# Patient Record
Sex: FEMALE | Race: OTHER | HISPANIC OR LATINO | URBAN - METROPOLITAN AREA
[De-identification: names, ages, dates, MRNs, and addresses within clinical notes are randomized per-mention and may not be internally consistent; named-entity substitution may affect disease eponyms.]

---

## 2019-09-03 ENCOUNTER — EMERGENCY (EMERGENCY)
Facility: HOSPITAL | Age: 69
LOS: 1 days | Discharge: HOME | End: 2019-09-03
Admitting: EMERGENCY MEDICINE
Payer: MEDICARE

## 2019-09-03 VITALS
WEIGHT: 179.9 LBS | TEMPERATURE: 98 F | RESPIRATION RATE: 18 BRPM | SYSTOLIC BLOOD PRESSURE: 127 MMHG | HEART RATE: 73 BPM | OXYGEN SATURATION: 98 % | DIASTOLIC BLOOD PRESSURE: 85 MMHG | HEIGHT: 66 IN

## 2019-09-03 DIAGNOSIS — M79.652 PAIN IN LEFT THIGH: ICD-10-CM

## 2019-09-03 DIAGNOSIS — M54.5 LOW BACK PAIN: ICD-10-CM

## 2019-09-03 PROCEDURE — 99283 EMERGENCY DEPT VISIT LOW MDM: CPT

## 2019-09-03 PROCEDURE — 99283 EMERGENCY DEPT VISIT LOW MDM: CPT | Mod: 25

## 2019-09-03 PROCEDURE — 96372 THER/PROPH/DIAG INJ SC/IM: CPT

## 2019-09-03 RX ORDER — KETOROLAC TROMETHAMINE 30 MG/ML
30 SYRINGE (ML) INJECTION ONCE
Refills: 0 | Status: DISCONTINUED | OUTPATIENT
Start: 2019-09-03 | End: 2019-09-03

## 2019-09-03 RX ORDER — MELOXICAM 15 MG/1
1 TABLET ORAL
Qty: 14 | Refills: 0
Start: 2019-09-03 | End: 2019-09-16

## 2019-09-03 RX ORDER — METHOCARBAMOL 500 MG/1
750 TABLET, FILM COATED ORAL ONCE
Refills: 0 | Status: COMPLETED | OUTPATIENT
Start: 2019-09-03 | End: 2019-09-03

## 2019-09-03 RX ORDER — METHOCARBAMOL 500 MG/1
1 TABLET, FILM COATED ORAL
Qty: 42 | Refills: 0
Start: 2019-09-03 | End: 2019-09-16

## 2019-09-03 RX ADMIN — METHOCARBAMOL 750 MILLIGRAM(S): 500 TABLET, FILM COATED ORAL at 21:40

## 2019-09-03 RX ADMIN — Medication 30 MILLIGRAM(S): at 21:40

## 2019-09-03 NOTE — ED PROVIDER NOTE - CLINICAL SUMMARY MEDICAL DECISION MAKING FREE TEXT BOX
70 y/o female with LBP with left leg radiation of pain. No urine/bm incontinence, do not suspect cord compression and no cauda equina. No chest pain/sob. Do not suspect cardiac/dissection. Pain treated and improved. Pt reports pain is chronic and very sim to past hx of pain. No hx of stone and no urinary symptoms without CVAT. Advised to continue PT and pt has a follow-up spine on Monday.

## 2019-09-03 NOTE — ED ADULT NURSE NOTE - OBJECTIVE STATEMENT
Patient c/o of atraumatic L LE pain radiating from lower back---reports hx of sciatica---+weight bearing, +FARFAN. Ambulates with steady gait with cane. Denies urinary/bowel incontinence.

## 2019-09-03 NOTE — ED ADULT NURSE NOTE - CHPI ED NUR SYMPTOMS NEG
no bladder dysfunction/no motor function loss/no bowel dysfunction/no constipation/no difficulty bearing weight/no anorexia/no fatigue/no neck tenderness/no numbness/no tingling

## 2019-09-03 NOTE — ED PROVIDER NOTE - PATIENT PORTAL LINK FT
You can access the FollowMyHealth Patient Portal offered by [facility] by registering at the following website: [email or web address]. By joining [facility]’s FollowMyHealth portal, you will also be able to view your health information using other applications (apps) compatible with our system.

## 2019-09-03 NOTE — ED ADULT TRIAGE NOTE - CHIEF COMPLAINT QUOTE
Presents to ED for leg pain.  States history of sciatic pain.  Denies CP, SOB, abd pain, fevers, chills, trauma to leg, recent falls, numbness, or tingling.

## 2019-09-03 NOTE — ED PROVIDER NOTE - NSFOLLOWUPINSTRUCTIONS_ED_ALL_ED_FT
Chronic Back Pain    WHAT YOU NEED TO KNOW:    Chronic back pain is back pain that lasts 3 months or longer. This may include pain that has not been controlled or does not improve with treatment. Your back pain may cause weakness or pain that spreads to your arms or legs.    DISCHARGE INSTRUCTIONS:    Call your doctor if:     You have severe pain.      You have new numbness, tingling, or weakness, especially in your lower back, legs, arms, or genital area.      You lose control of your bladder or bowel movements.       You have a fever or sudden weight loss.      You have new or worse pain.      You have questions or concerns about your condition or care.    Medicines: You may need any of the following:     NSAIDs help decrease swelling and pain or fever. This medicine is available with or without a doctor's order. NSAIDs can cause stomach bleeding or kidney problems in certain people. If you take blood thinner medicine, always ask your healthcare provider if NSAIDs are safe for you. Always read the medicine label and follow directions.      Acetaminophen decreases pain and fever. It is available without a doctor's order. Ask how much to take and how often to take it. Follow directions. Read the labels of all other medicines you are using to see if they also contain acetaminophen, or ask your doctor or pharmacist. Acetaminophen can cause liver damage if not taken correctly. Do not use more than 4 grams (4,000 milligrams) total of acetaminophen in one day.       Muscle relaxers help decrease muscle spasms and back pain.      Prescription pain medicine may be given. Ask your healthcare provider how to take this medicine safely. Some prescription pain medicines contain acetaminophen. Do not take other medicines that contain acetaminophen without talking to your healthcare provider. Too much acetaminophen may cause liver damage. Prescription pain medicine may cause constipation. Ask your healthcare provider how to prevent or treat constipation.       Take your medicine as directed. Contact your healthcare provider if you think your medicine is not helping or if you have side effects. Tell him or her if you are allergic to any medicine. Keep a list of the medicines, vitamins, and herbs you take. Include the amounts, and when and why you take them. Bring the list or the pill bottles to follow-up visits. Carry your medicine list with you in case of an emergency.    Manage your symptoms:     Apply ice for 15 to 20 minutes every hour, or as directed. Use an ice pack, or put crushed ice in a plastic bag. Cover it with a towel before you apply it to your skin. Ice decreases pain and helps prevent tissue damage.      Apply heat for 20 to 30 minutes every 2 hours, or as directed. Heat helps decrease pain and muscle spasms.      Use massage to loosen tense muscles. Massage may relieve back pain caused by tight muscles. Regular massages may help prevent this kind of back pain.      Ask about acupuncture for pain relief. Back pain is sometimes relieved with acupuncture. Talk to your healthcare provider before you get this treatment to make sure it is safe for you.    Other ways to relieve or prevent back pain:     Manage stress. Stress can cause back pain or make it worse. Some ways to reduce stress are listening to music, meditating, or using aromatherapy. It may help to talk with a therapist about anything that is causing you stress. Your healthcare provider can give you more information.      Stay active as much as you can without causing more pain. Ask your healthcare provider what exercises are right for you. Do not sit or lie down for long periods. This could make your back pain worse. Yoga or similar gentle movements may help relieve pain and tension in your back. Go slowly and do not strain your back as you do any movement.      Be careful when you lift heavy objects. Do not lift anything heavy until your pain is gone. Never strain your back when you lift a heavy item. If possible, ask someone to help you.      Go to physical therapy as directed. A physical therapist can teach you exercises to help improve movement and strength, and to decrease pain.    Follow up with your healthcare provider as directed: You may be referred to a sports medicine or spine specialist. Write down your questions so you remember to ask them during your visits.       © Copyright [facility] 2019 All illustrations and images included in CareNotes are the copyrighted property of [employer] or [employer].      back to top                      © Copyright [facility] 2019

## 2019-09-03 NOTE — ED PROVIDER NOTE - OBJECTIVE STATEMENT
68 y/o female with a PMHx of HNP with sciatica is present here in the ED c/o reoccurring back pain atraumatic. Pt reports the pain has been worsening over the past week despite taking naproxen for her pain. Pain is located on her left lower back with radiation down the back of her left leg. She describes a constant ache that increases with standing/walking and slightly improves with sitting. She denies the following: numbness/tingling of her legs, swelling, urinary symptoms, chest pain, sob, difficulty breathing, saddle anesthesia, loss of urine/bm, recent spinal injections, fever, chills, hx of CA.

## 2025-03-12 NOTE — ED ADULT NURSE NOTE - DISCHARGE DATE/TIME
Subjective   Angi Fuentes is a 57 y.o. female.     Chief Complaint   Patient presents with    Mixed hyperlipidemia     Follow up    Cough     Had flu a 3 weeks ago and has a lingering productive cough with green phlegm        Cough  Pertinent negatives include no shortness of breath or wheezing.      Patient here follow-up for cough, congestion, treated for flu 3 weeks ago received 2 doses of steroids, Z-Kirit still not getting better.  No chest pain or shortness of breath.  Chest x-ray was done on February 24 that was normal.  She is also here for follow-up for her hyperlipidemia.  Patient is not diabetic.  . Patientnotdiabetic    The following portions of the patient's history were reviewed and updated as appropriate: allergies, current medications, past family history, past medical history, past social history, past surgical history and problem list.    Review of Systems   Respiratory:  Positive for cough. Negative for apnea, chest tightness, shortness of breath and wheezing.    Cardiovascular: Negative.    Gastrointestinal: Negative.    Endocrine: Negative.        Allergies   Allergen Reactions    Levofloxacin Nausea Only     12/20/19: She states she had some leftover Levaquin that she took 2 doses of. She states she gets mild GI side effects to Levaquin if she takes it without food, but otherwise tolerates it well.        Current Outpatient Medications on File Prior to Visit   Medication Sig Dispense Refill    albuterol sulfate  (90 Base) MCG/ACT inhaler Inhale 2 puffs Every 4 (Four) Hours As Needed for Wheezing or Shortness of Air. 8 g 0    guaiFENesin (MUCINEX) 600 MG 12 hr tablet Take 2 tablets by mouth 2 (Two) Times a Day.      levothyroxine sodium (TIROSINT) 100 MCG capsule Take 1 capsule by mouth Daily. Patient says that she is taking 100mcg M-Saturday and Sunday she takes a half of a pill      promethazine-dextromethorphan (PROMETHAZINE-DM) 6.25-15 MG/5ML syrup Take 5 mL by mouth Every Night.  May repeat dose after 4 hours 118 mL 0     No current facility-administered medications on file prior to visit.       Family History   Problem Relation Age of Onset    Hodgkin's lymphoma Mother     Cancer Mother         Hodgkin’s disease    Hypertension Father     Heart failure Father     Colon polyps Sister     COPD Sister         Passed away 7-    Lung cancer Sister     Anemia Sister     Breast cancer Maternal Aunt     Colon polyps Maternal Aunt     Hypertension Maternal Grandmother     Hypertension Maternal Grandfather     Hypertension Paternal Grandmother     Colon cancer Neg Hx        Past Medical History:   Diagnosis Date    Diverticulosis     Hashimoto's thyroiditis     Hyperlipidemia     Hypothyroidism     Melanoma 2011    left lower leg    Melanoma in situ     PONV (postoperative nausea and vomiting)     Presence of pessary     Scoliosis 2000       Past Surgical History:   Procedure Laterality Date    COLONOSCOPY N/A 07/31/2019    Procedure: COLONOSCOPY WITH BIOPSIES;  Surgeon: Constance Rehman MD;  Location: [facility];  Service: Gastroenterology    DILATATION AND CURETTAGE  2000    HYSTERECTOMY  2013    OOPHORECTOMY Left 2013       Social History     Socioeconomic History    Marital status:     Number of children: 2   Tobacco Use    Smoking status: Every Day     Current packs/day: 0.50     Average packs/day: 0.5 packs/day for 10.0 years (5.0 ttl pk-yrs)     Types: Cigarettes     Passive exposure: Current    Smokeless tobacco: Never   Vaping Use    Vaping status: Never Used   Substance and Sexual Activity    Alcohol use: Yes     Comment: rare use    Drug use: No    Sexual activity: Yes     Partners: Male     Birth control/protection: Vasectomy       Patient Active Problem List   Diagnosis    Melanoma    Hypothyroidism    Hyperlipidemia    Colon cancer screening    Diverticulosis    Secondary polycythemia       /72 (BP Location: Left arm, Patient Position: Sitting, Cuff Size: Adult)    "Pulse 92   Temp 98.2 °F (36.8 °C) (Oral)   Ht 162.6 cm (64\")   Wt 67.9 kg (149 lb 12.8 oz)   LMP  (LMP Unknown) Comment: pt has 1 overie  SpO2 94%   BMI 25.71 kg/m²   Body mass index is 25.71 kg/m².    Objective   Physical Exam  Vitals and nursing note reviewed.   Constitutional:       Appearance: She is well-developed.   Eyes:      Pupils: Pupils are equal, round, and reactive to light.   Neck:      Thyroid: No thyromegaly.      Vascular: No JVD.      Trachea: No tracheal deviation.   Cardiovascular:      Rate and Rhythm: Normal rate and regular rhythm.      Heart sounds: No murmur heard.  Pulmonary:      Effort: Pulmonary effort is normal. No respiratory distress.      Breath sounds: Normal breath sounds. No wheezing.   Abdominal:      General: Bowel sounds are normal. There is no distension.      Palpations: Abdomen is soft. There is no mass.      Tenderness: There is no abdominal tenderness. There is no right CVA tenderness, left CVA tenderness, guarding or rebound.      Hernia: No hernia is present.   Musculoskeletal:      Cervical back: Normal range of motion and neck supple.   Lymphadenopathy:      Cervical: No cervical adenopathy.   Neurological:      Mental Status: She is alert and oriented to person, place, and time.           Assessment & Plan   Diagnoses and all orders for this visit:    1. Mixed hyperlipidemia (Primary)  -     CBC & Differential  -     Lipid Panel With / Chol / HDL Ratio  -     Comprehensive Metabolic Panel    2. Hypothyroidism, unspecified type  -     CBC & Differential  -     Lipid Panel With / Chol / HDL Ratio  -     Comprehensive Metabolic Panel    Other orders  -     levoFLOXacin (LEVAQUIN) 500 MG tablet; Take 1 tablet by mouth Daily.  Dispense: 7 tablet; Refill: 0    Patient to continue Mucinex.  Check blood pressure at home.  Continue Synthroid, follow-up with endocrinologist.  Patient does not want steroids or Z-Kirit at this time.  Levaquin given, patient understands if he " starts having muscle or joint pains discontinue Levaquin.  I will see her back in 6 months time.  Strongly advised patient to discontinue smoking.  Keep follow-up with endocrinology.  Reminded patient to get colonoscopy.  EHR dragon/transcription disclaimer:  Part of this note may be an electronic transcription/translation of spoken language to printed text using the Dragon Dictation System.                03-Sep-2019 21:55